# Patient Record
Sex: FEMALE | Race: WHITE | NOT HISPANIC OR LATINO | Employment: STUDENT | URBAN - METROPOLITAN AREA
[De-identification: names, ages, dates, MRNs, and addresses within clinical notes are randomized per-mention and may not be internally consistent; named-entity substitution may affect disease eponyms.]

---

## 2021-01-19 ENCOUNTER — TELEPHONE (OUTPATIENT)
Dept: INTERNAL MEDICINE | Facility: CLINIC | Age: 21
End: 2021-01-19

## 2021-01-19 NOTE — TELEPHONE ENCOUNTER
Pt mother called to confirm an appt with Dr Knox for her daughter, advised she is not a patient and she does not have an upcomming appt.  Mrs Avalos requesting to establish care with Dr Knox. Will be back from [facility] in May or June, so an appt around that time would be great.    Please advise    [phone]

## 2021-05-17 ENCOUNTER — OFFICE VISIT (OUTPATIENT)
Dept: INTERNAL MEDICINE | Facility: CLINIC | Age: 21
End: 2021-05-17

## 2021-05-17 VITALS
HEART RATE: 64 BPM | SYSTOLIC BLOOD PRESSURE: 104 MMHG | BODY MASS INDEX: 22.5 KG/M2 | DIASTOLIC BLOOD PRESSURE: 66 MMHG | TEMPERATURE: 97.3 F | HEIGHT: 63 IN | WEIGHT: 127 LBS

## 2021-05-17 DIAGNOSIS — Z00.00 PHYSICAL EXAM, ANNUAL: ICD-10-CM

## 2021-05-17 DIAGNOSIS — K90.0 CELIAC DISEASE: Primary | ICD-10-CM

## 2021-05-17 PROBLEM — M95.8 OSTEOCHONDRAL DEFECT OF TALUS: Status: ACTIVE | Noted: 2019-05-16

## 2021-05-17 PROCEDURE — 99385 PREV VISIT NEW AGE 18-39: CPT | Performed by: INTERNAL MEDICINE

## 2021-05-17 RX ORDER — NORETHINDRONE ACETATE AND ETHINYL ESTRADIOL, ETHINYL ESTRADIOL AND FERROUS FUMARATE 1MG-10(24)
1 KIT ORAL DAILY
COMMUNITY
Start: 2021-02-26 | End: 2022-06-29

## 2021-05-17 RX ORDER — OMEGA-3 FATTY ACIDS/FISH OIL 300-1000MG
CAPSULE ORAL
COMMUNITY
End: 2021-05-17 | Stop reason: SDUPTHER

## 2021-05-17 RX ORDER — COVID-19 ANTIGEN TEST
KIT MISCELLANEOUS
COMMUNITY
End: 2022-06-29

## 2021-05-17 NOTE — PROGRESS NOTES
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   Sees gyn every summer, no issues with OCP.  Sees psychiatrist for the Trintellix- seems to be working well.  Mainly helps with anxiety.  Family history of thyroid issues, hasn't been checked in several years.   Dx Celiac about 6 years ago.  Feels much better since being off gluten.  Can drink alcohol.       Objective   Vital Signs:   /66   Pulse 64   Temp 97.3 °F (36.3 °C)   Ht 160 cm (63\")   Wt 57.6 kg (127 lb)   BMI 22.50 kg/m²     Physical Exam  Constitutional:       Appearance: Normal appearance.   Cardiovascular:      Rate and Rhythm: Normal rate and regular rhythm.   Musculoskeletal:      Right lower leg: No edema.      Left lower leg: No edema.        Result Review :                 Assessment and Plan    Diagnoses and all orders for this visit:    1. Celiac disease (Primary)  Comments:  check labs to be sure not anemia- otherwise follow diet plan  Orders:  -     CBC & Differential  -     Comprehensive Metabolic Panel  -     TSH  -     Ferritin    2. Physical exam, annual  Comments:  Does great, stays healthy and active with field hockey- discussed good diet, alcohol, etc.         Follow Up   Return in about 1 year (around 5/17/2022) for Lab Today.  Patient was given instructions and counseling regarding her condition or for health maintenance advice. Please see specific information pulled into the AVS if appropriate.       "

## 2021-05-18 LAB
ALBUMIN SERPL-MCNC: 4.3 G/DL (ref 3.5–5.2)
ALBUMIN/GLOB SERPL: 2.3 G/DL
ALP SERPL-CCNC: 63 U/L (ref 39–117)
ALT SERPL-CCNC: 11 U/L (ref 1–33)
AST SERPL-CCNC: 17 U/L (ref 1–32)
BASOPHILS # BLD AUTO: 0.07 10*3/MM3 (ref 0–0.2)
BASOPHILS NFR BLD AUTO: 0.9 % (ref 0–1.5)
BILIRUB DIRECT SERPL-MCNC: 0.3 MG/DL (ref 0–0.3)
BILIRUB INDIRECT SERPL-MCNC: 1.3 MG/DL
BILIRUB SERPL-MCNC: 1.6 MG/DL (ref 0–1.2)
BILIRUB SERPL-MCNC: 1.7 MG/DL (ref 0–1.2)
BUN SERPL-MCNC: 12 MG/DL (ref 6–20)
BUN/CREAT SERPL: 13.8 (ref 7–25)
CALCIUM SERPL-MCNC: 9.4 MG/DL (ref 8.6–10.5)
CHLORIDE SERPL-SCNC: 106 MMOL/L (ref 98–107)
CO2 SERPL-SCNC: 25.9 MMOL/L (ref 22–29)
CREAT SERPL-MCNC: 0.87 MG/DL (ref 0.57–1)
EOSINOPHIL # BLD AUTO: 0.13 10*3/MM3 (ref 0–0.4)
EOSINOPHIL NFR BLD AUTO: 1.7 % (ref 0.3–6.2)
ERYTHROCYTE [DISTWIDTH] IN BLOOD BY AUTOMATED COUNT: 12.8 % (ref 12.3–15.4)
FERRITIN SERPL-MCNC: 44.7 NG/ML (ref 13–150)
GLOBULIN SER CALC-MCNC: 1.9 GM/DL
GLUCOSE SERPL-MCNC: 89 MG/DL (ref 65–99)
HCT VFR BLD AUTO: 43.7 % (ref 34–46.6)
HGB BLD-MCNC: 14.5 G/DL (ref 12–15.9)
IMM GRANULOCYTES # BLD AUTO: 0.02 10*3/MM3 (ref 0–0.05)
IMM GRANULOCYTES NFR BLD AUTO: 0.3 % (ref 0–0.5)
LYMPHOCYTES # BLD AUTO: 1.37 10*3/MM3 (ref 0.7–3.1)
LYMPHOCYTES NFR BLD AUTO: 17.9 % (ref 19.6–45.3)
Lab: NORMAL
MCH RBC QN AUTO: 31 PG (ref 26.6–33)
MCHC RBC AUTO-ENTMCNC: 33.2 G/DL (ref 31.5–35.7)
MCV RBC AUTO: 93.4 FL (ref 79–97)
MONOCYTES # BLD AUTO: 0.65 10*3/MM3 (ref 0.1–0.9)
MONOCYTES NFR BLD AUTO: 8.5 % (ref 5–12)
NEUTROPHILS # BLD AUTO: 5.4 10*3/MM3 (ref 1.7–7)
NEUTROPHILS NFR BLD AUTO: 70.7 % (ref 42.7–76)
NRBC BLD AUTO-RTO: 0.1 /100 WBC (ref 0–0.2)
PLATELET # BLD AUTO: 187 10*3/MM3 (ref 140–450)
POTASSIUM SERPL-SCNC: 4.3 MMOL/L (ref 3.5–5.2)
PROT SERPL-MCNC: 6.2 G/DL (ref 6–8.5)
RBC # BLD AUTO: 4.68 10*6/MM3 (ref 3.77–5.28)
SODIUM SERPL-SCNC: 141 MMOL/L (ref 136–145)
TSH SERPL DL<=0.005 MIU/L-ACNC: 1.46 UIU/ML (ref 0.27–4.2)
VERBAL ADD-ON: NORMAL
WBC # BLD AUTO: 7.64 10*3/MM3 (ref 3.4–10.8)
WRITTEN AUTHORIZATION: NORMAL

## 2022-06-29 ENCOUNTER — OFFICE VISIT (OUTPATIENT)
Dept: INTERNAL MEDICINE | Facility: CLINIC | Age: 22
End: 2022-06-29

## 2022-06-29 VITALS — HEIGHT: 63 IN | WEIGHT: 132 LBS | TEMPERATURE: 97.3 F | BODY MASS INDEX: 23.39 KG/M2

## 2022-06-29 DIAGNOSIS — Z00.00 PHYSICAL EXAM, ANNUAL: Primary | ICD-10-CM

## 2022-06-29 PROCEDURE — 99395 PREV VISIT EST AGE 18-39: CPT | Performed by: INTERNAL MEDICINE

## 2022-06-29 RX ORDER — DROSPIRENONE AND ETHINYL ESTRADIOL 0.03MG-3MG
KIT ORAL
COMMUNITY
Start: 2022-06-28

## 2022-06-29 NOTE — PROGRESS NOTES
"Chief Complaint  No chief complaint on file.    Subjective        Wandy Avalos presents to [facility]  History of Present Illness  Here for reg f/u- no new issues.  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 Has no complaints- saw gyn yesterday- switched her OCP.  Also sees psychiatrist, feels well controlled on Trintellix.      Objective   Vital Signs:  Temp 97.3 °F (36.3 °C)   Ht 160 cm (63\")   Wt 59.9 kg (132 lb)   BMI 23.38 kg/m²   Estimated body mass index is 23.38 kg/m² as calculated from the following:    Height as of this encounter: 160 cm (63\").    Weight as of this encounter: 59.9 kg (132 lb).    BMI is within normal parameters. No other follow-up for BMI required.      Physical Exam  Constitutional:       General: She is not in acute distress.  Eyes:      Conjunctiva/sclera: Conjunctivae normal.   Neck:      Thyroid: No thyromegaly.   Cardiovascular:      Rate and Rhythm: Normal rate and regular rhythm.      Heart sounds: Normal heart sounds.   Pulmonary:      Effort: Pulmonary effort is normal.      Breath sounds: Normal breath sounds.   Abdominal:      General: Bowel sounds are normal.      Palpations: Abdomen is soft.   Lymphadenopathy:      Cervical: No cervical adenopathy.   Skin:     General: Skin is warm and dry.   Neurological:      Mental Status: She is alert and oriented to person, place, and time.   Psychiatric:         Behavior: Behavior normal.         Thought Content: Thought content normal.         Judgment: Judgment normal.        Result Review :                Assessment and Plan   Diagnoses and all orders for this visit:    1. Physical exam, annual (Primary)  Comments:  Doing great overall- very active lifestyle.  Will continue same, understands Celiac diet.  Recheck 1-2 years or as needed             Follow Up   Return in about 1 year (around 6/29/2023).  Patient was given instructions and counseling regarding her condition or for health " maintenance advice. Please see specific information pulled into the AVS if appropriate.

## 2023-02-22 ENCOUNTER — TELEMEDICINE (OUTPATIENT)
Dept: INTERNAL MEDICINE | Facility: CLINIC | Age: 23
End: 2023-02-22
Payer: COMMERCIAL

## 2023-02-22 DIAGNOSIS — F32.A ANXIETY AND DEPRESSION: Primary | ICD-10-CM

## 2023-02-22 DIAGNOSIS — F41.9 ANXIETY AND DEPRESSION: Primary | ICD-10-CM

## 2023-02-22 PROCEDURE — 99214 OFFICE O/P EST MOD 30 MIN: CPT | Performed by: INTERNAL MEDICINE

## 2023-02-22 RX ORDER — DESVENLAFAXINE 100 MG/1
100 TABLET, EXTENDED RELEASE ORAL DAILY
Qty: 90 TABLET | Refills: 1 | Status: SHIPPED | OUTPATIENT
Start: 2023-02-22

## 2023-02-22 NOTE — PROGRESS NOTES
"Chief Complaint  Anxiety    Subjective        Wandy Avalos presents to [facility]  History of Present Illness Mode of Visit: Video  Location of patient: home  Location of provider: [facility]  You have chosen to receive care through a telehealth visit.  Does the patient consent to use a video/audio connection for your medical care today? Yes  The visit included audio and video interaction. No technical issues occurred during this visit.     Was put on Trentillix 2 years ago by previous MD- she had taken at least 2 SSRIs prior.  Had side effects of night sweats and increased anxiety with them. She is a senior in college, looking for jobs, etc.  Feels that her anxiety has not been well controlled for the last months. Describes an overwhelming sense of \"something isn't right\"  She has an excellent support system in her family.  Denies SI.     Objective   Vital Signs:  There were no vitals taken for this visit.  Estimated body mass index is 23.38 kg/m² as calculated from the following:    Height as of 6/29/22: 160 cm (63\").    Weight as of 6/29/22: 59.9 kg (132 lb).       BMI is within normal parameters. No other follow-up for BMI required.      Physical Exam  Constitutional:       Appearance: Normal appearance.        Result Review :                   Assessment and Plan   Diagnoses and all orders for this visit:    1. Anxiety and depression (Primary)  Comments:  will try Pristiq as it has been helpful to family members- we also will discuss the possible addn of Buspar, if she is having some breakthru anxiety.              Follow Up   No follow-ups on file.  Patient was given instructions and counseling regarding her condition or for health maintenance advice. Please see specific information pulled into the AVS if appropriate.       "

## 2023-09-07 RX ORDER — DESVENLAFAXINE 100 MG/1
TABLET, EXTENDED RELEASE ORAL
Qty: 90 TABLET | Refills: 1 | Status: SHIPPED | OUTPATIENT
Start: 2023-09-07

## 2024-03-13 RX ORDER — DESVENLAFAXINE 100 MG/1
TABLET, EXTENDED RELEASE ORAL
Qty: 90 TABLET | Refills: 1 | OUTPATIENT
Start: 2024-03-13

## 2024-03-15 ENCOUNTER — TELEPHONE (OUTPATIENT)
Dept: INTERNAL MEDICINE | Facility: CLINIC | Age: 24
End: 2024-03-15
Payer: COMMERCIAL

## 2024-03-15 RX ORDER — DESVENLAFAXINE 100 MG/1
100 TABLET, EXTENDED RELEASE ORAL DAILY
Qty: 90 TABLET | Refills: 1 | Status: SHIPPED | OUTPATIENT
Start: 2024-03-15

## 2024-03-15 NOTE — TELEPHONE ENCOUNTER
Requesting refill for: Pristiq 100 MG # 90 to [facility] pharmacy [address] [place] [address] [phone]  Made appointment for: 4/1/2024 @ 2:45 pm

## 2024-04-01 ENCOUNTER — OFFICE VISIT (OUTPATIENT)
Dept: INTERNAL MEDICINE | Facility: CLINIC | Age: 24
End: 2024-04-01
Payer: COMMERCIAL

## 2024-04-01 VITALS
DIASTOLIC BLOOD PRESSURE: 68 MMHG | WEIGHT: 135 LBS | SYSTOLIC BLOOD PRESSURE: 106 MMHG | BODY MASS INDEX: 23.92 KG/M2 | HEART RATE: 76 BPM | HEIGHT: 63 IN

## 2024-04-01 DIAGNOSIS — F32.A ANXIETY AND DEPRESSION: Chronic | ICD-10-CM

## 2024-04-01 DIAGNOSIS — Z00.00 PHYSICAL EXAM, ANNUAL: ICD-10-CM

## 2024-04-01 DIAGNOSIS — Z11.59 ENCOUNTER FOR HEPATITIS C SCREENING TEST FOR LOW RISK PATIENT: Primary | ICD-10-CM

## 2024-04-01 DIAGNOSIS — F41.9 ANXIETY AND DEPRESSION: Chronic | ICD-10-CM

## 2024-04-01 RX ORDER — OSELTAMIVIR PHOSPHATE 75 MG/1
1 CAPSULE ORAL EVERY 12 HOURS SCHEDULED
COMMUNITY
Start: 2023-12-19 | End: 2024-04-01

## 2024-04-01 RX ORDER — DESVENLAFAXINE 100 MG/1
100 TABLET, EXTENDED RELEASE ORAL DAILY
Qty: 90 TABLET | Refills: 4 | Status: SHIPPED | OUTPATIENT
Start: 2024-04-01

## 2024-04-01 NOTE — PROGRESS NOTES
"Subjective   Wandy Avalos is a 23 y.o. female and is here for a comprehensive physical exam. The patient reports no problems.    Pt is UTD with annual gyn exam - stopped taking the OCP because she didn't like the side effects.  Thinking about an IUD.   She is not sexually active currently.     She is taking good care of herself- exercising, eating well. 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 Living in [place].   Has been on Pristq - definitely wants to continue      Social History     Socioeconomic History    Marital status: Single   Tobacco Use    Smoking status: Never    Smokeless tobacco: Never   Vaping Use    Vaping status: Never Used   Substance and Sexual Activity    Alcohol use: Yes     Comment: socially    Drug use: Never    Sexual activity: Yes       Family History   Problem Relation Age of Onset    No Known Problems Mother     No Known Problems Father           Past Medical History:   Diagnosis Date    Anxiety     Celiac disease           Current Outpatient Medications:     desvenlafaxine (PRISTIQ) 100 MG 24 hr tablet, Take 1 tablet by mouth Daily., Disp: 90 tablet, Rfl: 4    Review of Systems    Review of Systems   Constitutional:  Negative for fatigue and unexpected weight loss.   Eyes:  Negative for visual disturbance.   Respiratory:  Negative for shortness of breath.    Cardiovascular:  Negative for chest pain and palpitations.   Gastrointestinal:  Negative for abdominal pain and blood in stool.   Endocrine: Negative for cold intolerance.   Genitourinary:  Negative for menstrual problem.   Musculoskeletal:  Negative for arthralgias and gait problem.   Neurological:  Negative for headache.   Psychiatric/Behavioral:  Negative for depressed mood.         There were no vitals filed for this visit.    Vitals:    04/01/24 1453   BP: 106/68   Pulse: 76   Weight: 61.2 kg (135 lb)   Height: 160 cm (63\")     Body mass index is 23.91 kg/m².  Wt Readings from Last 3 Encounters:   04/01/24 61.2 " kg (135 lb)   05/17/23 59 kg (130 lb)   06/29/22 59.9 kg (132 lb)      Objective     Physical Exam  Constitutional:       Appearance: Normal appearance.   Cardiovascular:      Rate and Rhythm: Normal rate and regular rhythm.   Pulmonary:      Effort: Pulmonary effort is normal.      Breath sounds: Normal breath sounds.   Abdominal:      General: Abdomen is flat. Bowel sounds are normal.      Palpations: Abdomen is soft.   Musculoskeletal:      Right lower leg: No edema.      Left lower leg: No edema.   Lymphadenopathy:      Cervical: No cervical adenopathy.   Skin:     General: Skin is warm and dry.   Psychiatric:         Mood and Affect: Mood normal.         Thought Content: Thought content normal.         Procedures       Assessment & Plan         Diagnoses and all orders for this visit:    1. Encounter for hepatitis C screening test for low risk patient (Primary)  -     Hepatitis C Antibody    2. Physical exam, annual  Comments:  Exam is favorable- check labs today. Discussed healthy habits, vaccines. Seeing gyn.  Orders:  -     Comprehensive Metabolic Panel  -     Lipid Panel With / Chol / HDL Ratio  -     TSH    3. Anxiety and depression  Comments:  Same Pristiq  Orders:  -     desvenlafaxine (PRISTIQ) 100 MG 24 hr tablet; Take 1 tablet by mouth Daily.  Dispense: 90 tablet; Refill: 4        Return in about 1 year (around 4/1/2025) for Annual physical, Lab Today.

## 2024-04-02 LAB
ALBUMIN SERPL-MCNC: 4.6 G/DL (ref 3.5–5.2)
ALBUMIN/GLOB SERPL: 2.3 G/DL
ALP SERPL-CCNC: 65 U/L (ref 39–117)
ALT SERPL-CCNC: 20 U/L (ref 1–33)
AST SERPL-CCNC: 22 U/L (ref 1–32)
BILIRUB SERPL-MCNC: 0.3 MG/DL (ref 0–1.2)
BUN SERPL-MCNC: 14 MG/DL (ref 6–20)
BUN/CREAT SERPL: 16.7 (ref 7–25)
CALCIUM SERPL-MCNC: 9.7 MG/DL (ref 8.6–10.5)
CHLORIDE SERPL-SCNC: 105 MMOL/L (ref 98–107)
CHOLEST SERPL-MCNC: 177 MG/DL (ref 0–200)
CHOLEST/HDLC SERPL: 2.9 {RATIO}
CO2 SERPL-SCNC: 27.4 MMOL/L (ref 22–29)
CREAT SERPL-MCNC: 0.84 MG/DL (ref 0.57–1)
EGFRCR SERPLBLD CKD-EPI 2021: 100.3 ML/MIN/1.73
GLOBULIN SER CALC-MCNC: 2 GM/DL
GLUCOSE SERPL-MCNC: 87 MG/DL (ref 65–99)
HCV IGG SERPL QL IA: NON REACTIVE
HDLC SERPL-MCNC: 61 MG/DL (ref 40–60)
LDLC SERPL CALC-MCNC: 86 MG/DL (ref 0–100)
POTASSIUM SERPL-SCNC: 4.1 MMOL/L (ref 3.5–5.2)
PROT SERPL-MCNC: 6.6 G/DL (ref 6–8.5)
SODIUM SERPL-SCNC: 144 MMOL/L (ref 136–145)
TRIGL SERPL-MCNC: 179 MG/DL (ref 0–150)
TSH SERPL DL<=0.005 MIU/L-ACNC: 1.77 UIU/ML (ref 0.27–4.2)
VLDLC SERPL CALC-MCNC: 30 MG/DL (ref 5–40)

## 2025-06-03 DIAGNOSIS — F32.A ANXIETY AND DEPRESSION: Chronic | ICD-10-CM

## 2025-06-03 DIAGNOSIS — F41.9 ANXIETY AND DEPRESSION: Chronic | ICD-10-CM

## 2025-06-03 RX ORDER — DESVENLAFAXINE 100 MG/1
100 TABLET, EXTENDED RELEASE ORAL DAILY
Qty: 90 TABLET | Refills: 0 | Status: SHIPPED | OUTPATIENT
Start: 2025-06-03